# Patient Record
Sex: MALE | Race: WHITE | Employment: STUDENT | URBAN - METROPOLITAN AREA
[De-identification: names, ages, dates, MRNs, and addresses within clinical notes are randomized per-mention and may not be internally consistent; named-entity substitution may affect disease eponyms.]

---

## 2018-03-30 ENCOUNTER — LAB ENCOUNTER (OUTPATIENT)
Dept: LAB | Age: 18
End: 2018-03-30
Attending: PEDIATRICS
Payer: COMMERCIAL

## 2018-03-30 DIAGNOSIS — K13.79 MOUTH SORES: ICD-10-CM

## 2018-03-30 PROCEDURE — 87529 HSV DNA AMP PROBE: CPT

## 2018-03-30 PROCEDURE — 36415 COLL VENOUS BLD VENIPUNCTURE: CPT

## 2018-04-03 NOTE — PROGRESS NOTES
[phone] (home) spoke to mom aware of Dr Homer Betancourt recommendations. No fevers, throat pain subsided. Still has some mouth pain, lips are chapped per mom. Not much of an appetite, still keeping hydrated with water, ice, jello.  Advised mom moisturizer and

## 2018-04-03 NOTE — PROGRESS NOTES
Patient to continue acyclovir as directed. 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 Please attain clinical update of how patient is doing. Thanks.